# Patient Record
Sex: FEMALE | Race: WHITE | NOT HISPANIC OR LATINO | Employment: STUDENT | URBAN - METROPOLITAN AREA
[De-identification: names, ages, dates, MRNs, and addresses within clinical notes are randomized per-mention and may not be internally consistent; named-entity substitution may affect disease eponyms.]

---

## 2017-05-19 ENCOUNTER — CONVERSION ENCOUNTER (OUTPATIENT)
Dept: FAMILY MEDICINE CLINIC | Facility: CLINIC | Age: 22
End: 2017-05-19

## 2017-05-25 ENCOUNTER — OFFICE (OUTPATIENT)
Dept: URBAN - METROPOLITAN AREA CLINIC 64 | Facility: CLINIC | Age: 22
End: 2017-05-25

## 2017-05-25 VITALS
HEART RATE: 75 BPM | HEIGHT: 63 IN | SYSTOLIC BLOOD PRESSURE: 111 MMHG | DIASTOLIC BLOOD PRESSURE: 71 MMHG | WEIGHT: 142 LBS

## 2017-05-25 DIAGNOSIS — R10.30 LOWER ABDOMINAL PAIN, UNSPECIFIED: ICD-10-CM

## 2017-05-25 DIAGNOSIS — K58.9 IRRITABLE BOWEL SYNDROME WITHOUT DIARRHEA: ICD-10-CM

## 2017-05-25 DIAGNOSIS — K21.9 GASTRO-ESOPHAGEAL REFLUX DISEASE WITHOUT ESOPHAGITIS: ICD-10-CM

## 2017-05-25 PROCEDURE — 99214 OFFICE O/P EST MOD 30 MIN: CPT | Performed by: INTERNAL MEDICINE

## 2017-05-25 RX ORDER — RANITIDINE 300 MG/1
600 TABLET ORAL
Qty: 60 | Refills: 11 | Status: ACTIVE
Start: 2017-05-25

## 2017-05-25 RX ORDER — DICYCLOMINE HYDROCHLORIDE 20 MG/1
40 TABLET ORAL
Qty: 60 | Refills: 11 | Status: ACTIVE
Start: 2017-05-25

## 2017-05-30 ENCOUNTER — HOSPITAL ENCOUNTER (OUTPATIENT)
Dept: OTHER | Facility: HOSPITAL | Age: 22
Discharge: HOME | End: 2017-05-30
Attending: OBSTETRICS & GYNECOLOGY | Admitting: OBSTETRICS & GYNECOLOGY

## 2017-05-30 LAB
ABO + RH BLD: NORMAL
ALBUMIN SERPL-MCNC: 4.2 G/DL (ref 3.5–4.8)
ALBUMIN/GLOB SERPL: 1.2 {RATIO} (ref 1–1.7)
ALP SERPL-CCNC: 45 IU/L (ref 32–91)
ALT SERPL-CCNC: 31 IU/L (ref 14–54)
ANION GAP SERPL CALC-SCNC: 12.3 MMOL/L (ref 10–20)
ARMBAND: NORMAL
AST SERPL-CCNC: 21 IU/L (ref 15–41)
BASOPHILS # BLD AUTO: 0 10*3/UL (ref 0–0.2)
BASOPHILS NFR BLD AUTO: 1 % (ref 0–2)
BILIRUB SERPL-MCNC: 0.7 MG/DL (ref 0.3–1.2)
BLD COMPONENT TYPE: NORMAL
BLD GP AB SCN SERPL QL: NEGATIVE
BUN SERPL-MCNC: 13 MG/DL (ref 8–20)
BUN/CREAT SERPL: 16.3 (ref 5.4–26.2)
CALCIUM SERPL-MCNC: 9.6 MG/DL (ref 8.9–10.3)
CHLORIDE SERPL-SCNC: 104 MMOL/L (ref 101–111)
CONV CO2: 27 MMOL/L (ref 22–32)
CONV TOTAL PROTEIN: 7.7 G/DL (ref 6.1–7.9)
CREAT UR-MCNC: 0.8 MG/DL (ref 0.4–1)
CROSSMATCH EXPIRATION: NORMAL
DIFFERENTIAL METHOD BLD: (no result)
EOSINOPHIL # BLD AUTO: 0.1 10*3/UL (ref 0–0.3)
EOSINOPHIL # BLD AUTO: 1 % (ref 0–3)
ERYTHROCYTE [DISTWIDTH] IN BLOOD BY AUTOMATED COUNT: 12.6 % (ref 11.5–14.5)
ERYTHROCYTE [SEDIMENTATION RATE] IN BLOOD BY WESTERGREN METHOD: 12 MM/HR (ref 0–20)
GLOBULIN UR ELPH-MCNC: 3.5 G/DL (ref 2.5–3.8)
GLUCOSE SERPL-MCNC: 88 MG/DL (ref 65–99)
HCT VFR BLD AUTO: 41.6 % (ref 35–49)
HGB BLD-MCNC: 14.2 G/DL (ref 12–15)
LYMPHOCYTES # BLD AUTO: 1.5 10*3/UL (ref 0.8–4.8)
LYMPHOCYTES NFR BLD AUTO: 18 % (ref 18–42)
MCH RBC QN AUTO: 31.5 PG (ref 26–32)
MCHC RBC AUTO-ENTMCNC: 34.1 G/DL (ref 32–36)
MCV RBC AUTO: 92.3 FL (ref 80–94)
MONOCYTES # BLD AUTO: 0.7 10*3/UL (ref 0.1–1.3)
MONOCYTES NFR BLD AUTO: 9 % (ref 2–11)
NEUTROPHILS # BLD AUTO: 6 10*3/UL (ref 2.3–8.6)
NEUTROPHILS NFR BLD AUTO: 71 % (ref 50–75)
NRBC BLD AUTO-RTO: 0 /100{WBCS}
NRBC/RBC NFR BLD MANUAL: 0 10*3/UL
PLATELET # BLD AUTO: 246 10*3/UL (ref 150–450)
PMV BLD AUTO: 9.3 FL (ref 7.4–10.4)
POTASSIUM SERPL-SCNC: 4.3 MMOL/L (ref 3.6–5.1)
RBC # BLD AUTO: 4.51 10*6/UL (ref 4–5.4)
SODIUM SERPL-SCNC: 139 MMOL/L (ref 136–144)
T PALLIDUM IGG SER QL: NONREACTIVE
TSH SERPL-ACNC: 0.4 UIU/ML (ref 0.34–5.6)
WBC # BLD AUTO: 8.3 10*3/UL (ref 4.5–11.5)

## 2017-06-02 ENCOUNTER — HOSPITAL ENCOUNTER (OUTPATIENT)
Dept: ULTRASOUND IMAGING | Facility: HOSPITAL | Age: 22
Discharge: HOME | End: 2017-06-02
Attending: FAMILY MEDICINE | Admitting: FAMILY MEDICINE

## 2017-06-19 ENCOUNTER — CONVERSION ENCOUNTER (OUTPATIENT)
Dept: FAMILY MEDICINE CLINIC | Facility: CLINIC | Age: 22
End: 2017-06-19

## 2017-07-07 ENCOUNTER — CONVERSION ENCOUNTER (OUTPATIENT)
Dept: FAMILY MEDICINE CLINIC | Facility: CLINIC | Age: 22
End: 2017-07-07

## 2017-07-26 ENCOUNTER — HOSPITAL ENCOUNTER (OUTPATIENT)
Dept: CT IMAGING | Facility: HOSPITAL | Age: 22
Discharge: HOME | End: 2017-07-26
Attending: OBSTETRICS & GYNECOLOGY | Admitting: OBSTETRICS & GYNECOLOGY

## 2017-07-27 ENCOUNTER — HOSPITAL ENCOUNTER (OUTPATIENT)
Dept: FAMILY MEDICINE CLINIC | Facility: CLINIC | Age: 22
Setting detail: SPECIMEN
Discharge: HOME | End: 2017-07-27
Attending: FAMILY MEDICINE | Admitting: FAMILY MEDICINE

## 2017-07-27 ENCOUNTER — CONVERSION ENCOUNTER (OUTPATIENT)
Dept: FAMILY MEDICINE CLINIC | Facility: CLINIC | Age: 22
End: 2017-07-27

## 2017-08-01 ENCOUNTER — HOSPITAL ENCOUNTER (OUTPATIENT)
Dept: FAMILY MEDICINE CLINIC | Facility: CLINIC | Age: 22
Setting detail: SPECIMEN
Discharge: HOME | End: 2017-08-01
Attending: FAMILY MEDICINE | Admitting: FAMILY MEDICINE

## 2017-08-01 ENCOUNTER — CONVERSION ENCOUNTER (OUTPATIENT)
Dept: FAMILY MEDICINE CLINIC | Facility: CLINIC | Age: 22
End: 2017-08-01

## 2017-08-01 LAB
ALBUMIN SERPL-MCNC: 4.1 G/DL (ref 3.5–4.8)
ALBUMIN/GLOB SERPL: 1.4 {RATIO} (ref 1–1.7)
ALP SERPL-CCNC: 42 IU/L (ref 32–91)
ALT SERPL-CCNC: 17 IU/L (ref 14–54)
ANION GAP SERPL CALC-SCNC: 13.6 MMOL/L (ref 10–20)
AST SERPL-CCNC: 18 IU/L (ref 15–41)
BILIRUB SERPL-MCNC: 0.8 MG/DL (ref 0.3–1.2)
BUN SERPL-MCNC: 11 MG/DL (ref 8–20)
BUN/CREAT SERPL: 13.8 (ref 5.4–26.2)
CALCIUM SERPL-MCNC: 9.6 MG/DL (ref 8.9–10.3)
CHLORIDE SERPL-SCNC: 105 MMOL/L (ref 101–111)
CHOLEST SERPL-MCNC: 201 MG/DL
CHOLEST/HDLC SERPL: 3.9 {RATIO}
CONV CO2: 25 MMOL/L (ref 22–32)
CONV LDL CHOLESTEROL DIRECT: 132 MG/DL (ref 0–100)
CONV TOTAL PROTEIN: 7.1 G/DL (ref 6.1–7.9)
CREAT UR-MCNC: 0.8 MG/DL (ref 0.4–1)
ERYTHROCYTE [DISTWIDTH] IN BLOOD BY AUTOMATED COUNT: 12.5 % (ref 11.5–14.5)
GLOBULIN UR ELPH-MCNC: 3 G/DL (ref 2.5–3.8)
GLUCOSE SERPL-MCNC: 90 MG/DL (ref 65–99)
HCT VFR BLD AUTO: 39.4 % (ref 35–49)
HDLC SERPL-MCNC: 51 MG/DL
HGB BLD-MCNC: 13.4 G/DL (ref 12–15)
LDLC/HDLC SERPL: 2.6 {RATIO}
LIPID INTERPRETATION: ABNORMAL
MCH RBC QN AUTO: 31.4 PG (ref 26–32)
MCHC RBC AUTO-ENTMCNC: 34 G/DL (ref 32–36)
MCV RBC AUTO: 92.3 FL (ref 80–94)
PLATELET # BLD AUTO: 230 10*3/UL (ref 150–450)
PMV BLD AUTO: 9.4 FL (ref 7.4–10.4)
POTASSIUM SERPL-SCNC: 4.6 MMOL/L (ref 3.6–5.1)
RBC # BLD AUTO: 4.27 10*6/UL (ref 4–5.4)
SODIUM SERPL-SCNC: 139 MMOL/L (ref 136–144)
TRIGL SERPL-MCNC: 127 MG/DL
TSH SERPL-ACNC: 1.16 UIU/ML (ref 0.34–5.6)
VLDLC SERPL CALC-MCNC: 18.4 MG/DL
WBC # BLD AUTO: 9 10*3/UL (ref 4.5–11.5)

## 2017-08-14 ENCOUNTER — CONVERSION ENCOUNTER (OUTPATIENT)
Dept: FAMILY MEDICINE CLINIC | Facility: CLINIC | Age: 22
End: 2017-08-14

## 2017-08-24 ENCOUNTER — HOSPITAL ENCOUNTER (OUTPATIENT)
Dept: CARDIOLOGY | Facility: HOSPITAL | Age: 22
Discharge: HOME | End: 2017-08-24
Attending: INTERNAL MEDICINE | Admitting: INTERNAL MEDICINE

## 2017-09-05 ENCOUNTER — CONVERSION ENCOUNTER (OUTPATIENT)
Dept: FAMILY MEDICINE CLINIC | Facility: CLINIC | Age: 22
End: 2017-09-05

## 2019-06-04 VITALS
SYSTOLIC BLOOD PRESSURE: 101 MMHG | SYSTOLIC BLOOD PRESSURE: 104 MMHG | RESPIRATION RATE: 16 BRPM | WEIGHT: 142 LBS | RESPIRATION RATE: 18 BRPM | BODY MASS INDEX: 24.98 KG/M2 | WEIGHT: 141 LBS | WEIGHT: 139.38 LBS | BODY MASS INDEX: 24.98 KG/M2 | DIASTOLIC BLOOD PRESSURE: 79 MMHG | HEART RATE: 70 BPM | WEIGHT: 139.13 LBS | OXYGEN SATURATION: 100 % | DIASTOLIC BLOOD PRESSURE: 65 MMHG | DIASTOLIC BLOOD PRESSURE: 80 MMHG | OXYGEN SATURATION: 99 % | BODY MASS INDEX: 24.65 KG/M2 | WEIGHT: 138 LBS | HEART RATE: 81 BPM | DIASTOLIC BLOOD PRESSURE: 69 MMHG | BODY MASS INDEX: 24.98 KG/M2 | HEART RATE: 97 BPM | HEART RATE: 99 BPM | HEART RATE: 77 BPM | OXYGEN SATURATION: 100 % | BODY MASS INDEX: 24.69 KG/M2 | RESPIRATION RATE: 14 BRPM | RESPIRATION RATE: 16 BRPM | HEIGHT: 63 IN | WEIGHT: 141 LBS | OXYGEN SATURATION: 100 % | RESPIRATION RATE: 16 BRPM | SYSTOLIC BLOOD PRESSURE: 120 MMHG | HEART RATE: 90 BPM | SYSTOLIC BLOOD PRESSURE: 112 MMHG | RESPIRATION RATE: 16 BRPM | WEIGHT: 141 LBS | SYSTOLIC BLOOD PRESSURE: 112 MMHG | DIASTOLIC BLOOD PRESSURE: 73 MMHG | DIASTOLIC BLOOD PRESSURE: 73 MMHG | SYSTOLIC BLOOD PRESSURE: 112 MMHG | DIASTOLIC BLOOD PRESSURE: 71 MMHG | OXYGEN SATURATION: 97 % | BODY MASS INDEX: 24.45 KG/M2 | BODY MASS INDEX: 25.16 KG/M2 | SYSTOLIC BLOOD PRESSURE: 125 MMHG | RESPIRATION RATE: 18 BRPM

## 2019-09-23 ENCOUNTER — OFFICE VISIT (OUTPATIENT)
Dept: FAMILY MEDICINE CLINIC | Facility: CLINIC | Age: 24
End: 2019-09-23

## 2019-09-23 VITALS
TEMPERATURE: 98.5 F | WEIGHT: 142 LBS | RESPIRATION RATE: 16 BRPM | HEIGHT: 63 IN | DIASTOLIC BLOOD PRESSURE: 78 MMHG | HEART RATE: 94 BPM | SYSTOLIC BLOOD PRESSURE: 128 MMHG | BODY MASS INDEX: 25.16 KG/M2 | OXYGEN SATURATION: 97 %

## 2019-09-23 DIAGNOSIS — Z23 FLU VACCINE NEED: ICD-10-CM

## 2019-09-23 DIAGNOSIS — H01.136 ATOPIC DERMATITIS OF EYELID, LEFT: ICD-10-CM

## 2019-09-23 DIAGNOSIS — K21.00 GASTROESOPHAGEAL REFLUX DISEASE WITH ESOPHAGITIS: Primary | ICD-10-CM

## 2019-09-23 PROBLEM — R00.2 PALPITATIONS: Status: ACTIVE | Noted: 2017-08-14

## 2019-09-23 PROBLEM — R14.0 ABDOMINAL BLOATING: Status: ACTIVE | Noted: 2017-05-19

## 2019-09-23 PROBLEM — M54.9 BACK PAIN: Status: ACTIVE | Noted: 2017-05-19

## 2019-09-23 PROBLEM — R07.9 CHEST PAIN: Status: ACTIVE | Noted: 2017-07-27

## 2019-09-23 PROBLEM — R51.9 HEADACHE: Status: ACTIVE | Noted: 2017-05-19

## 2019-09-23 PROBLEM — I95.9 LOW BLOOD PRESSURE: Status: ACTIVE | Noted: 2017-05-19

## 2019-09-23 PROBLEM — R63.0 LOSS OF APPETITE: Status: ACTIVE | Noted: 2017-05-19

## 2019-09-23 PROBLEM — Z87.19 HX OF HIATAL HERNIA: Status: ACTIVE | Noted: 2017-07-27

## 2019-09-23 PROBLEM — G43.909 MIGRAINE: Status: ACTIVE | Noted: 2019-09-23

## 2019-09-23 PROBLEM — F41.9 ANXIETY: Status: ACTIVE | Noted: 2017-05-19

## 2019-09-23 PROCEDURE — 90674 CCIIV4 VAC NO PRSV 0.5 ML IM: CPT | Performed by: FAMILY MEDICINE

## 2019-09-23 PROCEDURE — 90471 IMMUNIZATION ADMIN: CPT | Performed by: FAMILY MEDICINE

## 2019-09-23 PROCEDURE — 99213 OFFICE O/P EST LOW 20 MIN: CPT | Performed by: FAMILY MEDICINE

## 2019-09-23 RX ORDER — RANITIDINE 150 MG/1
150 CAPSULE ORAL 2 TIMES DAILY
Qty: 60 CAPSULE | Refills: 1 | Status: SHIPPED | OUTPATIENT
Start: 2019-09-23 | End: 2019-11-30 | Stop reason: SDUPTHER

## 2019-09-25 ENCOUNTER — TELEPHONE (OUTPATIENT)
Dept: FAMILY MEDICINE CLINIC | Facility: CLINIC | Age: 24
End: 2019-09-25

## 2019-09-25 NOTE — TELEPHONE ENCOUNTER
Pt would like to have the sleep study she says was discussed at her last ov on 9/23/19.  Please add referral

## 2019-09-26 DIAGNOSIS — R06.81 APNEA: Primary | ICD-10-CM

## 2019-09-26 DIAGNOSIS — R53.83 OTHER FATIGUE: ICD-10-CM

## 2019-10-02 ENCOUNTER — OFFICE VISIT (OUTPATIENT)
Dept: NEUROLOGY | Facility: CLINIC | Age: 24
End: 2019-10-02

## 2019-10-02 VITALS
DIASTOLIC BLOOD PRESSURE: 79 MMHG | WEIGHT: 147.2 LBS | BODY MASS INDEX: 26.08 KG/M2 | SYSTOLIC BLOOD PRESSURE: 123 MMHG | HEART RATE: 84 BPM | HEIGHT: 63 IN

## 2019-10-02 DIAGNOSIS — G47.33 OBSTRUCTIVE SLEEP APNEA: Primary | ICD-10-CM

## 2019-10-02 PROCEDURE — 99244 OFF/OP CNSLTJ NEW/EST MOD 40: CPT | Performed by: PSYCHIATRY & NEUROLOGY

## 2019-10-02 NOTE — PROGRESS NOTES
Sleep Medicine initial Consultation    Jo-Ann Will  : 1995  24 y.o. female   Date of Service: 10/5/2019  Referring provider: Paula Roy DO    Chief complaint: day time sleepiness    New sleep referred by pcp, neck measures 13 1/2 inches.     Miss Jo-Ann Will  is a 24 y.o. right handed  female patient has a H/O IBS, GERD is here for the evaluation of Snoring reported to snore by boy friend, Excessive Daytime Sleepiness, Chronic Fatigue, wakes several times during the night.  On occasional  has woke gasping.     The patient gives a history of hypnagogic hallucinations, has woke unable to move, and hallucination visual at the same time. This has happened while taking a nap during the day.   Has has had sleep paralysis with out dream images. No cataplexy.    The patient complains of snoring loud in all sleeping positions, has witnessed episodes of sleep apnea, awakened gasping for breath,     Has had migraines for years.  Wakes with  morning headaches, that can develop into a migraine, has dry mouth or sore mouth when he wakes up and excessive sweating during sleep.    Has sleep onset insomnia, usually about one hour.    The patient complains of difficulty falling asleep, difficulty staying asleep, frequent awakenings 2-3, has restless sleep, Does not feel rested even after a long sleep and Still feels sleepy even when increasing sleep time.    The patient reports history of frequent nightmares, every night, always scary,     Sleep schedule: Bedtime:10-12 , gets out of bed at 5-9, sleep latency: 1-2 hours, Gets about 5-6 hours of sleep.    Patient father has sleep apnea and currently uses a mouth piece.   Patient has hx of headaches and migraines onset 2 years.   Patient has history of dizziness with passing out spell       [place] Sleepiness Scale score:   EPWORTH SLEEPINESS SCALE  Sitting and reading  3  WatchingTV  3  Sitting, inactive, in a public place  3  As a passenger in a car for  1 hour w/o a break  3  Lying down to rest in the afternoon  3  Sitting and talking to someone  0  Sitting quietly after a lunch  3  In a car, while stopped for traffic or a light  0  Total 18        18/24.  Past Medical History:   Diagnosis Date   • Anxiety 5/19/2017   • GERD (gastroesophageal reflux disease)    • Headache 5/19/2017   • Hx of hiatal hernia 07/27/2017   • Migraine 9/23/2019     Past Surgical History:   Procedure Laterality Date   • EXPLORATORY LAPAROTOMY      NO ENDOMETRIOSIS     Current Outpatient Medications on File Prior to Visit   Medication Sig Dispense Refill   • Crisaborole (EUCRISA) 2 % ointment Apply 1 application topically 2 (Two) Times a Day. To atopic dermatitis face rash until gone then repeat prn 100 g 0   • norgestrel-ethinyl estradiol (LOW-OGESTREL) 0.3-30 MG-MCG per tablet LOW-OGESTREL 0.3-30 MG-MCG TABS     • ranitidine (ZANTAC) 150 MG capsule Take 1 capsule by mouth 2 (Two) Times a Day. 60 capsule 1     No current facility-administered medications on file prior to visit.      No Known Allergies  Family History   Problem Relation Age of Onset   • No Known Problems Mother    • Other Father         Traumatic Brain Injury   • No Known Problems Brother    • No Known Problems Brother      Social History     Socioeconomic History   • Marital status: Single     Spouse name: Not on file   • Number of children: Not on file   • Years of education: Not on file   • Highest education level: Not on file   Tobacco Use   • Smoking status: Current Some Day Smoker     Types: Cigarettes   • Smokeless tobacco: Never Used   • Tobacco comment: 1 cig/wk   Substance and Sexual Activity   • Alcohol use: Yes     Frequency: Monthly or less     Comment: occ   • Drug use: No   • Sexual activity: Yes     Partners: Male     Birth control/protection: OCP     Review of Systems   Constitutional: Positive for fatigue. Negative for appetite change.   HENT: Negative for sinus pressure and sinus pain.    Eyes: Negative  for pain and itching.   Respiratory: Positive for shortness of breath. Negative for cough.    Cardiovascular: Negative for chest pain and palpitations.   Gastrointestinal: Negative for constipation and diarrhea.   Endocrine: Negative for cold intolerance and heat intolerance.   Genitourinary: Negative for difficulty urinating and frequency.   Musculoskeletal: Negative for back pain and neck pain.   Allergic/Immunologic: Negative for environmental allergies.   Neurological: Positive for dizziness, syncope and headaches. Negative for tremors, seizures, facial asymmetry, speech difficulty, weakness, light-headedness and numbness.   Psychiatric/Behavioral: Negative for agitation and confusion.     I reviewed and addressed ROS entered by MA.    Patient examination:  Vitals:    10/02/19 1638   BP: 123/79   Pulse: 84    Body mass index is 26.08 kg/m².     Physical Exam   Constitutional: She is oriented to person, place, and time. She appears well-developed and well-nourished.   HENT:   Head: Normocephalic.   Eyes: Conjunctivae and EOM are normal. Pupils are equal, round, and reactive to light.   Cardiovascular: Normal rate, regular rhythm and normal heart sounds.   Pulmonary/Chest: Effort normal and breath sounds normal. She has no wheezes.   Musculoskeletal: Normal range of motion. She exhibits no edema or deformity.   Neurological: She is alert and oriented to person, place, and time. No cranial nerve deficit. Coordination normal.   Psychiatric: She has a normal mood and affect. Her behavior is normal.   Vitals reviewed.      ASSESSMENT AND PLAN:    The patient has symptoms of obstructive sleep apnea syndrome with hypersomnia. We will proceed with polysomnography and treat with CPAP therapy if needed.     She may have narcolepsy in addition to the possible OSAShe has hypersomnolence, sleep paralysis and hypnagogic hallucinations, but no cataplexy,  If no sleep apnea is found on the hst will proceed with npsg /  MSLT      Encounter Diagnosis   Name Primary?   • Obstructive sleep apnea Yes       Orders Placed This Encounter   Procedures   • Home Sleep Study       Return in about 3 months (around 1/2/2020) for Recheck.    This document has been electronically signed by Joseph Seipel, MD  on October 5, 2019 6:51 PM

## 2019-10-05 RX ORDER — ZOLPIDEM TARTRATE 5 MG/1
TABLET ORAL
Qty: 1 TABLET | Refills: 0 | Status: SHIPPED | OUTPATIENT
Start: 2019-10-05 | End: 2020-04-08

## 2019-10-09 ENCOUNTER — HOSPITAL ENCOUNTER (OUTPATIENT)
Dept: SLEEP MEDICINE | Facility: HOSPITAL | Age: 24
Discharge: HOME | End: 2019-10-09
Admitting: PSYCHIATRY & NEUROLOGY

## 2019-10-09 VITALS — WEIGHT: 140 LBS | HEIGHT: 63 IN | BODY MASS INDEX: 24.8 KG/M2

## 2019-10-09 DIAGNOSIS — G47.33 OBSTRUCTIVE SLEEP APNEA: ICD-10-CM

## 2019-10-09 PROCEDURE — 95806 SLEEP STUDY UNATT&RESP EFFT: CPT

## 2019-10-11 PROCEDURE — 95806 SLEEP STUDY UNATT&RESP EFFT: CPT | Performed by: PSYCHIATRY & NEUROLOGY

## 2019-10-28 ENCOUNTER — TELEPHONE (OUTPATIENT)
Dept: NEUROLOGY | Facility: CLINIC | Age: 24
End: 2019-10-28

## 2019-12-04 RX ORDER — RANITIDINE 150 MG/1
TABLET ORAL
Qty: 60 TABLET | Refills: 0 | Status: SHIPPED | OUTPATIENT
Start: 2019-12-04

## 2020-01-06 ENCOUNTER — TELEPHONE (OUTPATIENT)
Dept: FAMILY MEDICINE CLINIC | Facility: CLINIC | Age: 25
End: 2020-01-06

## 2020-01-06 DIAGNOSIS — G43.C0 PERIODIC HEADACHE SYNDROME, NOT INTRACTABLE: Primary | ICD-10-CM

## 2020-01-06 NOTE — TELEPHONE ENCOUNTER
Pt LM asking for a diff referral for Neuro. She currently sees Dr Cook for migraines and wants to switch.

## 2020-03-30 ENCOUNTER — TELEPHONE (OUTPATIENT)
Dept: NEUROLOGY | Facility: CLINIC | Age: 25
End: 2020-03-30

## 2020-03-30 NOTE — TELEPHONE ENCOUNTER
Callback Number:[phone]    Patient states that her headaches are becoming extremely worse and no OTC pain meds are working for her. She is asking if there is any way she could be seen sooner or if Dr.Seipel would prescribe her prescription grade headache medicine until then?

## 2020-03-30 NOTE — TELEPHONE ENCOUNTER
Schedule a fu pt visit appt   Was seen for sleep last fall for sleep hst was normal  May need npsg/mslt if still sleepy    saw dr mobley only  once in 2018 and by NP several times

## 2020-03-30 NOTE — TELEPHONE ENCOUNTER
Was on trokendi XR, and resatriptan  through Dr. Mesa a year ago,  and seemed to help currently taking tylenol please advise.

## 2020-03-31 NOTE — TELEPHONE ENCOUNTER
PT CALLED IN AND HAS FOLLOW UP APPT SCHEDULED FOR SLEEP WITH DR. SEIPEL. PT WANTS TO KNOW IF DR. SEIPEL IS GOING TO CALL IN HER MIGRAINE MEDICATION.    BEST CALL BACK - [phone]

## 2020-04-08 ENCOUNTER — OFFICE VISIT (OUTPATIENT)
Dept: NEUROLOGY | Facility: CLINIC | Age: 25
End: 2020-04-08

## 2020-04-08 VITALS
HEIGHT: 63 IN | TEMPERATURE: 98.2 F | BODY MASS INDEX: 27.93 KG/M2 | SYSTOLIC BLOOD PRESSURE: 135 MMHG | HEART RATE: 94 BPM | WEIGHT: 157.6 LBS | DIASTOLIC BLOOD PRESSURE: 85 MMHG

## 2020-04-08 DIAGNOSIS — F41.9 ANXIETY: ICD-10-CM

## 2020-04-08 DIAGNOSIS — F51.04 PSYCHOPHYSIOLOGICAL INSOMNIA: ICD-10-CM

## 2020-04-08 DIAGNOSIS — G43.019 INTRACTABLE MIGRAINE WITHOUT AURA AND WITHOUT STATUS MIGRAINOSUS: Primary | ICD-10-CM

## 2020-04-08 PROCEDURE — 99214 OFFICE O/P EST MOD 30 MIN: CPT | Performed by: PSYCHIATRY & NEUROLOGY

## 2020-04-08 RX ORDER — RIZATRIPTAN BENZOATE 10 MG/1
10 TABLET, ORALLY DISINTEGRATING ORAL ONCE AS NEEDED
Qty: 9 TABLET | Refills: 11 | Status: SHIPPED | OUTPATIENT
Start: 2020-04-08 | End: 2021-04-12

## 2020-04-08 RX ORDER — TOPIRAMATE 25 MG/1
25 CAPSULE, EXTENDED RELEASE ORAL DAILY
Qty: 90 CAPSULE | Refills: 3 | Status: SHIPPED | OUTPATIENT
Start: 2020-04-08 | End: 2020-06-04

## 2020-04-08 RX ORDER — AMITRIPTYLINE HYDROCHLORIDE 10 MG/1
TABLET, FILM COATED ORAL
Qty: 60 TABLET | Refills: 5 | Status: SHIPPED | OUTPATIENT
Start: 2020-04-08 | End: 2020-10-08 | Stop reason: SDUPTHER

## 2020-04-08 NOTE — PROGRESS NOTES
"Subjective: Headaches    Patient ID: Jo-Ann Will is a 24 y.o. female.    CHIEF COMPLAINT: Headaches/Migraines, Fatigue    History of Present Illness,   Patient has hx of headaches and migraines onset 3 years.   Previously seen Dr. Mesa currently taking OTC medication Excedrin or bendryle.   Patient states headaches comes on randomly will wake up with one and will become a migraine.   Most of them will last all day, usually starts on one side and the neck area.   Triggered by garlic, light sensitive with nausea and vomiting will have to sleep it off in a dark room and will sometimes wake up with a headache still. Patient gets 5-6 migraines a month and daily headaches. No recent imaging,     Tried Topamax crazy dreams and felt \"off\" , then Trokendi-xr tolerated better  Maxalt helped.    Patient has history of dizziness with passing out spell, haven't had any in awhile. Dizziness seems associated with migraine.    Co stiffness in neck.     Hypersomnolence,  HST 10/9/2019 ahi 0.8 lowest of 02 91% no sleep apnea is found on the hst. Patient has chronic fatigue along with brain fog with confusion and agitation. The patient gives a history of hypnagogic hallucinations, has woke unable to move, and hallucination visual at the same time. This has happened while taking a nap during the day.  No more sleep paralysis episodes.. Has had hypnagogic hallucinations.  No cataplexy.     Has sleep onset insomnia, usually about one hour. Patient takes benadryl to help with sleep. Gets about 5-6 hours per night., fragmented, no trouble going back to sleep once gets to sleep. On weeks ends sleeps up to 11 hours      mother has history of headaches/migraines and father with history of sleep apnea.     The following portions of the patient's history were reviewed and updated as appropriate: allergies, current medications, past family history, past medical history, past social history, past surgical history and problem " list.      Family History   Problem Relation Age of Onset   • No Known Problems Mother    • Other Father         Traumatic Brain Injury   • No Known Problems Brother    • No Known Problems Brother        Past Medical History:   Diagnosis Date   • Anxiety 5/19/2017   • GERD (gastroesophageal reflux disease)    • Headache 5/19/2017   • Hx of hiatal hernia 07/27/2017   • Migraine 9/23/2019       Social History     Socioeconomic History   • Marital status: Single     Spouse name: Not on file   • Number of children: Not on file   • Years of education: Not on file   • Highest education level: Not on file   Tobacco Use   • Smoking status: Current Some Day Smoker     Types: Cigarettes   • Smokeless tobacco: Never Used   • Tobacco comment: 1 cig/wk   Substance and Sexual Activity   • Alcohol use: Yes     Frequency: Monthly or less     Comment: occ   • Drug use: No   • Sexual activity: Yes     Partners: Male     Birth control/protection: OCP         Current Outpatient Medications:   •  Crisaborole (EUCRISA) 2 % ointment, Apply 1 application topically 2 (Two) Times a Day. To atopic dermatitis face rash until gone then repeat prn, Disp: 100 g, Rfl: 0  •  Dupilumab (Dupixent) 300 MG/2ML solution prefilled syringe, , Disp: , Rfl:   •  norgestrel-ethinyl estradiol (LOW-OGESTREL) 0.3-30 MG-MCG per tablet, LOW-OGESTREL 0.3-30 MG-MCG TABS, Disp: , Rfl:   •  raNITIdine (ZANTAC) 150 MG tablet, TAKE ONE TABLET BY MOUTH TWICE A DAY, Disp: 60 tablet, Rfl: 0  •  amitriptyline (ELAVIL) 10 MG tablet, One or two tab po at hs PRN, Disp: 60 tablet, Rfl: 5  •  rizatriptan MLT (Maxalt-MLT) 10 MG disintegrating tablet, Place 1 tablet on the tongue 1 (One) Time As Needed for Migraine for up to 1 dose. May repeat in 2 hours if needed, Disp: 9 tablet, Rfl: 11  •  Topiramate ER (Trokendi XR) 25 MG capsule sustained-release 24 hr, Take 25 mg by mouth Daily., Disp: 90 capsule, Rfl: 3    Review of Systems   Constitutional: Positive for fatigue. Negative  for appetite change.   HENT: Negative for sinus pressure and sinus pain.    Eyes: Positive for visual disturbance. Negative for pain and itching.   Respiratory: Negative for cough and shortness of breath.    Cardiovascular: Negative for chest pain and palpitations.   Gastrointestinal: Positive for constipation. Negative for diarrhea.   Endocrine: Negative for cold intolerance and heat intolerance.   Genitourinary: Negative for difficulty urinating and frequency.   Musculoskeletal: Positive for neck pain. Negative for back pain.   Allergic/Immunologic: Negative for environmental allergies.   Neurological: Positive for dizziness and headaches. Negative for tremors, seizures, syncope, facial asymmetry, speech difficulty, weakness, light-headedness and numbness.   Psychiatric/Behavioral: Positive for agitation and confusion.        I have reviewed ROS completed by medical assistant.     Objective:    Neurologic Exam     Mental Status   Oriented to person, place, and time.   Attention: normal.   Level of consciousness: alert    Cranial Nerves     CN III, IV, VI   Pupils are equal, round, and reactive to light.  Extraocular motions are normal.     CN VII   Facial expression full, symmetric.     CN VIII   CN VIII normal.     Motor Exam   Muscle bulk: normal    Strength   Strength 5/5 throughout.     Sensory Exam   Light touch normal.     Gait, Coordination, and Reflexes     Gait  Gait: normal      Physical Exam   Constitutional: She is oriented to person, place, and time. She appears well-developed.   Eyes: Pupils are equal, round, and reactive to light. EOM are normal.   Pulmonary/Chest: Effort normal.   Musculoskeletal: Normal range of motion.   Neurological: She is oriented to person, place, and time. She has normal strength. Gait normal.   Psychiatric: She has a normal mood and affect.   Vitals reviewed.      Assessment/Plan:    Jo-Ann was seen today for headache and hypersomnlence.    Diagnoses and all orders for  this visit:    Intractable migraine without aura and without status migrainosus    Psychophysiological insomnia    Anxiety    Other orders  -     Topiramate ER (Trokendi XR) 25 MG capsule sustained-release 24 hr; Take 25 mg by mouth Daily.  -     rizatriptan MLT (Maxalt-MLT) 10 MG disintegrating tablet; Place 1 tablet on the tongue 1 (One) Time As Needed for Migraine for up to 1 dose. May repeat in 2 hours if needed  -     amitriptyline (ELAVIL) 10 MG tablet; One or two tab po at hs PRN    Pt has migraine, no change from the past, so defer imaging.  Resume trokendi and Maxalt as taken in the past    For the insomnia, rx low dose elavil which may also help with the migraine      EPWORTH SLEEPINESS SCALE  Sitting and reading  3  WatchingTV  3  Sitting, inactive, in a public place  0  As a passenger in a car for 1 hour w/o a break  3  Lying down to rest in the afternoon  3  Sitting and talking to someone  0  Sitting quietly after a lunch  3  In a car, while stopped for traffic or a light  0  Total 15          This document has been electronically signed by Joseph Seipel, MD on April 8, 2020 14:35

## 2020-04-13 ENCOUNTER — TELEPHONE (OUTPATIENT)
Dept: NEUROLOGY | Facility: CLINIC | Age: 25
End: 2020-04-13

## 2020-04-13 NOTE — TELEPHONE ENCOUNTER
Provider: DR. SEIPEL  Caller: SHANELLE AMARO  Relationship to Patient: SELF  Phone Number: [phone]      Reason for Call: PT CALLING STATING THE [facility]  Phone#  [phone] NEEDS THE AUTHORIZATION FOR THE MEDICATION  Topiramate ER (Trokendi XR), PLEASE CALL PHARMACY AND GIVE THE AUTHORIZATION, PT STATES THAT THE INSURANCE NEEDS TO KNOW THAT SHE ALREADY TRIED THE TOPIRAMATE, PLEASE CALL HER WHEN THE MEDICATION AUTHORIZATION GETS CALLED INTO THE PHARMACY

## 2020-06-02 ENCOUNTER — TELEPHONE (OUTPATIENT)
Dept: NEUROLOGY | Facility: CLINIC | Age: 25
End: 2020-06-02

## 2020-06-02 NOTE — TELEPHONE ENCOUNTER
PT CALLED TO REQUEST INCREASE IN TROKENDI RX, STATES DR SEIPEL TOLD HER TO CALL IF SHE WISHED TO DO SO.    PT IS CURRENTLY TAKING 25 MG / DAY, HAS 11 DAYS' WORTH ON HAND.    PT LAST SEEN 4-8-20    PLEASE CALL BACK RE INCREASING DOSE: [phone]    PHARMACY:  [facility]  [address]  [phone]

## 2020-06-04 RX ORDER — TOPIRAMATE 50 MG/1
50 CAPSULE, EXTENDED RELEASE ORAL DAILY
Qty: 90 CAPSULE | Refills: 3 | Status: SHIPPED | OUTPATIENT
Start: 2020-06-04 | End: 2020-10-08 | Stop reason: SDUPTHER

## 2020-10-08 ENCOUNTER — OFFICE VISIT (OUTPATIENT)
Dept: NEUROLOGY | Facility: CLINIC | Age: 25
End: 2020-10-08

## 2020-10-08 VITALS
TEMPERATURE: 98 F | HEIGHT: 63 IN | WEIGHT: 157 LBS | SYSTOLIC BLOOD PRESSURE: 121 MMHG | DIASTOLIC BLOOD PRESSURE: 77 MMHG | HEART RATE: 91 BPM | BODY MASS INDEX: 27.82 KG/M2

## 2020-10-08 DIAGNOSIS — F41.9 ANXIETY: ICD-10-CM

## 2020-10-08 DIAGNOSIS — G43.019 INTRACTABLE MIGRAINE WITHOUT AURA AND WITHOUT STATUS MIGRAINOSUS: Primary | ICD-10-CM

## 2020-10-08 DIAGNOSIS — F51.04 PSYCHOPHYSIOLOGICAL INSOMNIA: ICD-10-CM

## 2020-10-08 PROCEDURE — 99213 OFFICE O/P EST LOW 20 MIN: CPT | Performed by: PSYCHIATRY & NEUROLOGY

## 2020-10-08 RX ORDER — ETONOGESTREL AND ETHINYL ESTRADIOL 11.7; 2.7 MG/1; MG/1
INSERT, EXTENDED RELEASE VAGINAL
COMMUNITY
Start: 2020-09-30

## 2020-10-08 RX ORDER — TOPIRAMATE 50 MG/1
50 CAPSULE, EXTENDED RELEASE ORAL DAILY
Qty: 90 CAPSULE | Refills: 3 | Status: SHIPPED | OUTPATIENT
Start: 2020-10-08 | End: 2021-04-12

## 2020-10-08 RX ORDER — AMITRIPTYLINE HYDROCHLORIDE 10 MG/1
TABLET, FILM COATED ORAL
Qty: 90 TABLET | Refills: 3 | Status: SHIPPED | OUTPATIENT
Start: 2020-10-08 | End: 2021-04-12

## 2020-10-08 NOTE — PROGRESS NOTES
Subjective: Intractable migraine without aura and without status migrainosus, Psychophysiological insomnia, Anxiety    Patient ID: Jo-Ann Will is a 25 y.o. female.    History of Present Illness, 6 month f/u     Intractable migraine without aura and without status migrainosus, currently taking Trokendi and Maxalt.   Last migraine was 2 weeks ago feels like the Maxalt isn't relieving the migraine fast enough her migraine lasted about 12 hours.      Psychophysiological insomnia, treated with Elavil 10 mg 1 or 2 at hs prn. Sleeps better with the elavil     Anxiety, unchanged    The following portions of the patient's history were reviewed and updated as appropriate: allergies, current medications, past family history, past medical history, past social history, past surgical history and problem list.    Family History   Problem Relation Age of Onset   • No Known Problems Mother    • Other Father         Traumatic Brain Injury   • No Known Problems Brother    • No Known Problems Brother        Past Medical History:   Diagnosis Date   • Anxiety 5/19/2017   • GERD (gastroesophageal reflux disease)    • Headache 5/19/2017   • Hx of hiatal hernia 07/27/2017   • Migraine 9/23/2019       Social History     Socioeconomic History   • Marital status: Single     Spouse name: Not on file   • Number of children: Not on file   • Years of education: Not on file   • Highest education level: Not on file   Tobacco Use   • Smoking status: Current Some Day Smoker     Types: Cigarettes   • Smokeless tobacco: Never Used   • Tobacco comment: 1 cig/wk   Substance and Sexual Activity   • Alcohol use: Yes     Frequency: Monthly or less     Comment: occ   • Drug use: No   • Sexual activity: Yes     Partners: Male     Birth control/protection: OCP         Current Outpatient Medications:   •  amitriptyline (ELAVIL) 10 MG tablet, One or two tab po at hs PRN, Disp: 60 tablet, Rfl: 5  •  Dupilumab (Dupixent) 300 MG/2ML solution prefilled syringe,  , Disp: , Rfl:   •  etonogestrel-ethinyl estradiol (NUVARING) 0.12-0.015 MG/24HR vaginal ring, INSERT 1 RING VAGINALLY AS DIRECTED. REMOVE AFTER 3 WEEKS & WAIT 7 DAYS BEFORE INSERTING A NEW RING, Disp: , Rfl:   •  raNITIdine (ZANTAC) 150 MG tablet, TAKE ONE TABLET BY MOUTH TWICE A DAY, Disp: 60 tablet, Rfl: 0  •  rizatriptan MLT (Maxalt-MLT) 10 MG disintegrating tablet, Place 1 tablet on the tongue 1 (One) Time As Needed for Migraine for up to 1 dose. May repeat in 2 hours if needed, Disp: 9 tablet, Rfl: 11  •  Topiramate ER (Trokendi XR) 50 MG capsule sustained-release 24 hr, Take 1 capsule by mouth Daily., Disp: 90 capsule, Rfl: 3    Review of Systems   Constitutional: Positive for fatigue. Negative for appetite change.   HENT: Negative for sinus pressure and sinus pain.    Eyes: Positive for visual disturbance. Negative for pain and itching.   Respiratory: Negative for cough and shortness of breath.    Cardiovascular: Negative for chest pain and palpitations.   Gastrointestinal: Positive for constipation. Negative for diarrhea.   Endocrine: Negative for cold intolerance and heat intolerance.   Genitourinary: Negative for difficulty urinating and frequency.   Musculoskeletal: Positive for neck pain. Negative for back pain.   Allergic/Immunologic: Negative for environmental allergies.   Neurological: Positive for headaches. Negative for dizziness, tremors, seizures, syncope, facial asymmetry, speech difficulty, weakness, light-headedness and numbness.   Psychiatric/Behavioral: Negative for agitation and confusion.          I have reviewed ROS completed by medical assistant.     Objective:    Neurologic Exam     Mental Status   Oriented to person, place, and time.   Attention: normal.   Level of consciousness: alert    Cranial Nerves     CN II   Visual fields full to confrontation.     CN VIII   CN VIII normal.       Physical Exam  Vitals signs reviewed.   Neurological:      Mental Status: She is oriented to person,  place, and time.   Psychiatric:         Mood and Affect: Mood normal.         Behavior: Behavior normal.         Assessment/Plan:    Jo-Ann was seen today for migraine.    Diagnoses and all orders for this visit:    Intractable migraine without aura and without status migrainosus    Anxiety    Psychophysiological insomnia        Continue trokindi for migraine preventiion  Try ubrelvy for ha pain    For insomnia continue elavil       This document has been electronically signed by Joseph Seipel, MD on October 8, 2020 09:14 EDT

## 2021-03-23 ENCOUNTER — TELEPHONE (OUTPATIENT)
Dept: NEUROLOGY | Facility: CLINIC | Age: 26
End: 2021-03-23

## 2021-03-23 DIAGNOSIS — R25.1 EPISODE OF SHAKING: Primary | ICD-10-CM

## 2021-03-23 NOTE — TELEPHONE ENCOUNTER
Provider: DR SEIPEL     Caller:  SHANELLE LARA  Relationship to Patient: PT    Phone Number: [phone]  Reason for Call:  PT CALLING IN TODAY TO RESCHEDULE APPT FOR SOONER DUE TO NEW SYMPTOMS .. PT STATES SHE WOKE UP LAST NIGHT DUE TO HER WHOLE BODY SHAKING .. PT STATES THIS HAS HAPPENED BEFORE  ABOUT TWO WEEKS AGO .PT STATES THAT THE EPISODE LAST NIGHT HAS HER SCARED IT HAS NEVER HAPPENED LIKE THAT AND SHE IS CONCERNED AS SHE IS NOT ABLE TO GET HER MIGRAINE MEDS NOW DUE TO INSURANCE AND EXPENSE .. PT HAS APPT SCHEDULED NOW FOR . 04/12/2021  When was the patient last seen: 10/08/2020  When did it start: FIRST TIME WAS A MONTH AGO   Where is it located: WHOLE BODY   Characteristics of symptom/severity:PT STATES IT FEELS LIKE A TREMOR PT STATES SHE WAKES THINKING HER BED IS SHAKING HER HEART IS POUNDING AND WHEN IT ENDS SHE STATES HER HEAD FEELS TINGLY ..    PLEASE ADVISE

## 2021-03-23 NOTE — TELEPHONE ENCOUNTER
Please see message from patient, she is currently scheduled with you in April. We can put her on cancellation list? Anything in mean time?

## 2021-04-08 NOTE — PROGRESS NOTES
Chief Complaint  Headache    Subjective          Jo-Ann Will presents to [facility] for headaches  History of Present Illness    Intractable migraine without aura and without status migrainosus, was unable to get Trokendi and Maxalt due to cost after the first of the year started.   Headaches are less frequent  One bad migraine each month  Has mild ha 4 or 5     Psychophysiological insomnia, pt stopped taking amitriptyline she believes it was making her sleep issues worse. Pt states she's been having more realistic dreams, hears screaming.     EEG was normal, pt has woke shaking.   Wakes many times per night. Sleeps about 5 hours per night.   Mostly trouble staying a sleep           Current Outpatient Medications:   •  etonogestrel-ethinyl estradiol (NUVARING) 0.12-0.015 MG/24HR vaginal ring, INSERT 1 RING VAGINALLY AS DIRECTED. REMOVE AFTER 3 WEEKS & WAIT 7 DAYS BEFORE INSERTING A NEW RING, Disp: , Rfl:   •  raNITIdine (ZANTAC) 150 MG tablet, TAKE ONE TABLET BY MOUTH TWICE A DAY, Disp: 60 tablet, Rfl: 0  •  traZODone (DESYREL) 50 MG tablet, Take 1 tablet by mouth Every Night., Disp: 30 tablet, Rfl: 11  •  ubrogepant (ubrogepant) 100 MG tablet, Take 1 tablet by mouth As Needed (migraine)., Disp: 15 tablet, Rfl: 3    Review of Systems   Constitutional: Negative for appetite change, fatigue and fever.   HENT: Negative for postnasal drip and sore throat.    Eyes: Negative for discharge and itching.   Respiratory: Negative for cough and shortness of breath.    Cardiovascular: Negative for chest pain and palpitations.   Gastrointestinal: Negative for constipation and diarrhea.   Genitourinary: Negative for frequency and urgency.   Musculoskeletal: Negative for back pain and neck pain.   Neurological: Positive for headaches. Negative for dizziness and light-headedness.          Objective:    Vital Signs:   /77 (BP Location: Left arm, Patient Position: Sitting, Cuff Size: Large Adult)  "  Pulse 85   Temp 97.3 °F (36.3 °C) (Infrared)   Ht 160 cm (63\")   Wt 66.2 kg (146 lb)   BMI 25.86 kg/m²     Physical Exam  Vitals reviewed.   HENT:      Head: Normocephalic.   Neurological:      General: No focal deficit present.      Mental Status: She is alert and oriented to person, place, and time.   Psychiatric:         Mood and Affect: Mood normal.        Result Review :                Neurologic Exam     Mental Status   Oriented to person, place, and time.   Level of consciousness: alert        Assessment and Plan    Diagnoses and all orders for this visit:    1. Intractable migraine without aura and without status migrainosus (Primary)    2. Tension-type headache, not intractable, unspecified chronicity pattern    3. Insomnia, unspecified type    Other orders  -     traZODone (DESYREL) 50 MG tablet; Take 1 tablet by mouth Every Night.  Dispense: 30 tablet; Refill: 11         Follow Up   Return in about 1 year (around 4/12/2022).  Patient was given instructions and counseling regarding her condition or for health maintenance advice. Please see specific information pulled into the AVS if appropriate.     Migraine improved   No longer needs preventative medication at this time    May have sleep myoclonus,  With arousals  Will rx  Trazodone, consider gabapentin.         This document has been electronically signed by Joseph Seipel, MD on April 12, 2021 09:38 EDT      "

## 2021-04-09 ENCOUNTER — HOSPITAL ENCOUNTER (OUTPATIENT)
Dept: NEUROLOGY | Facility: HOSPITAL | Age: 26
Discharge: HOME | End: 2021-04-09
Admitting: PSYCHIATRY & NEUROLOGY

## 2021-04-09 DIAGNOSIS — R25.1 EPISODE OF SHAKING: ICD-10-CM

## 2021-04-09 PROCEDURE — 95816 EEG AWAKE AND DROWSY: CPT | Performed by: PSYCHIATRY & NEUROLOGY

## 2021-04-09 PROCEDURE — 95816 EEG AWAKE AND DROWSY: CPT

## 2021-04-12 ENCOUNTER — OFFICE VISIT (OUTPATIENT)
Dept: NEUROLOGY | Facility: CLINIC | Age: 26
End: 2021-04-12

## 2021-04-12 VITALS
TEMPERATURE: 97.3 F | SYSTOLIC BLOOD PRESSURE: 121 MMHG | HEART RATE: 85 BPM | BODY MASS INDEX: 25.87 KG/M2 | HEIGHT: 63 IN | WEIGHT: 146 LBS | DIASTOLIC BLOOD PRESSURE: 77 MMHG

## 2021-04-12 DIAGNOSIS — G43.019 INTRACTABLE MIGRAINE WITHOUT AURA AND WITHOUT STATUS MIGRAINOSUS: Primary | ICD-10-CM

## 2021-04-12 DIAGNOSIS — G47.00 INSOMNIA, UNSPECIFIED TYPE: ICD-10-CM

## 2021-04-12 DIAGNOSIS — G44.209 TENSION-TYPE HEADACHE, NOT INTRACTABLE, UNSPECIFIED CHRONICITY PATTERN: ICD-10-CM

## 2021-04-12 PROCEDURE — 99214 OFFICE O/P EST MOD 30 MIN: CPT | Performed by: PSYCHIATRY & NEUROLOGY

## 2021-04-12 RX ORDER — TRAZODONE HYDROCHLORIDE 50 MG/1
50 TABLET ORAL NIGHTLY
Qty: 30 TABLET | Refills: 11 | Status: SHIPPED | OUTPATIENT
Start: 2021-04-12